# Patient Record
Sex: MALE | Race: WHITE | NOT HISPANIC OR LATINO | Employment: OTHER | URBAN - METROPOLITAN AREA
[De-identification: names, ages, dates, MRNs, and addresses within clinical notes are randomized per-mention and may not be internally consistent; named-entity substitution may affect disease eponyms.]

---

## 2022-07-12 ENCOUNTER — MEDICAL CORRESPONDENCE (OUTPATIENT)
Dept: HEALTH INFORMATION MANAGEMENT | Facility: CLINIC | Age: 62
End: 2022-07-12

## 2022-07-14 ENCOUNTER — REFERRAL (OUTPATIENT)
Dept: TRANSPLANT | Facility: CLINIC | Age: 62
End: 2022-07-14

## 2022-07-14 DIAGNOSIS — K65.2 SBP (SPONTANEOUS BACTERIAL PERITONITIS) (H): ICD-10-CM

## 2022-07-14 DIAGNOSIS — Z12.5 ENCOUNTER FOR SCREENING FOR MALIGNANT NEOPLASM OF PROSTATE: ICD-10-CM

## 2022-07-14 DIAGNOSIS — J90 PLEURAL EFFUSION: ICD-10-CM

## 2022-07-14 DIAGNOSIS — K74.60 LIVER CIRRHOSIS SECONDARY TO NASH (H): ICD-10-CM

## 2022-07-14 DIAGNOSIS — Z11.59 ENCOUNTER FOR SCREENING FOR OTHER VIRAL DISEASES: ICD-10-CM

## 2022-07-14 DIAGNOSIS — R94.39 ABNORMAL RESULT OF OTHER CARDIOVASCULAR FUNCTION STUDY: ICD-10-CM

## 2022-07-14 DIAGNOSIS — K76.82 HEPATIC ENCEPHALOPATHY (H): ICD-10-CM

## 2022-07-14 DIAGNOSIS — K75.81 LIVER CIRRHOSIS SECONDARY TO NASH (H): ICD-10-CM

## 2022-07-14 DIAGNOSIS — E11.9 DIABETES MELLITUS, TYPE 2 (H): ICD-10-CM

## 2022-07-14 DIAGNOSIS — K76.6 PORTAL HYPERTENSION (H): ICD-10-CM

## 2022-07-14 DIAGNOSIS — K70.30 ALCOHOLIC CIRRHOSIS (H): Primary | ICD-10-CM

## 2022-07-14 NOTE — LETTER
July 21, 2022    Juan Epperson  [address] [facility] [address]  [place] [address]    Dear Juan,    Thank you for your interest in the Transplant Center at [facility]. We look forward to being a part of your care team and assisting you through the transplant process.    As we discussed, your transplant coordinator is Ra Winslow Jr. (Liver).  You may call your coordinator at any time with questions or concerns at [phone].    Please complete the following.    1. Fill out and return the enclosed forms    Authorization for Electronic Communication    Authorization to Discuss Protected Health Information    Authorization for Release of Protected Health Information    Authorization for Care Everywhere Release of Information    2. Sign up for:    [employer], access to your electronic medical record (see enclosed pamphlet)    "[employer], a transplant education website    You can use these tools to learn more about your transplant, communicate with your care team, and track your medical details      Sincerely,      Solid Organ Transplant  [facility]    cc: Referring Physician and PCP

## 2022-07-14 NOTE — LETTER
PHYSICIAN ORDERS    DATE & TIME ISSUED: 2022 11:59 AM  PATIENT NAME: Juan Epperson   : 1960     [facility] MR# : [id number]     DIAGNOSIS:  cirrhosis, pleural effusion, pneumonia   ICD-10 CODE: K74.60, J90.0, J18.9  Orders  1 year after issue.  Please schedule in prep for liver transplant evaluation, ideally before 22  These labs must be drawn all together on the same day when done:          CTA Anginogram coronary arteries - Please do in [place]     PLEASE FAX RESULTS AS SOON AS POSSIBLE TO [phone], ATTN:Taylor    .  Hepatology/Liver Transplant  Medical Director, Liver Transplantation  [facility]

## 2022-07-14 NOTE — LETTER
PHYSICIAN ORDERS    DATE & TIME ISSUED: 2022 11:59 AM  PATIENT NAME: Juan Epperson   : 1960     [facility] MR# : [id number]     DIAGNOSIS:  cirrhosis, pleural effusion, pneumonia   ICD-10 CODE: K74.60, J90.0, J18.9  Orders  1 year after issue.  Please schedule in prep for liver transplant evaluation, ideally before 22  These labs must be drawn all together on the same day when done:     Pulmonary Function Tests (PFTs)          CTA Anginogram coronary arteries          DEXA bone density scan     PLEASE FAX RESULTS AS SOON AS POSSIBLE TO [phone], ATTN:Taylor    .  Hepatology/Liver Transplant  Medical Director, Liver Transplantation  [facility]

## 2022-07-20 ENCOUNTER — TELEPHONE (OUTPATIENT)
Dept: TRANSPLANT | Facility: CLINIC | Age: 62
End: 2022-07-20

## 2022-07-20 VITALS — BODY MASS INDEX: 22.53 KG/M2 | WEIGHT: 170 LBS | HEIGHT: 73 IN

## 2022-07-20 PROBLEM — K76.82 HEPATIC ENCEPHALOPATHY (H): Status: ACTIVE | Noted: 2022-07-20

## 2022-07-20 PROBLEM — E11.9 DIABETES MELLITUS, TYPE 2 (H): Status: ACTIVE | Noted: 2022-07-20

## 2022-07-20 PROBLEM — K74.60 LIVER CIRRHOSIS SECONDARY TO NASH (H): Status: ACTIVE | Noted: 2022-07-20

## 2022-07-20 PROBLEM — J90 PLEURAL EFFUSION: Status: ACTIVE | Noted: 2022-07-20

## 2022-07-20 PROBLEM — K75.81 LIVER CIRRHOSIS SECONDARY TO NASH (H): Status: ACTIVE | Noted: 2022-07-20

## 2022-07-20 PROBLEM — K65.2 SBP (SPONTANEOUS BACTERIAL PERITONITIS) (H): Status: ACTIVE | Noted: 2022-07-20

## 2022-07-20 NOTE — TELEPHONE ENCOUNTER
Patient was asked the following questions during liver intake call.     Referring Provider: Rosanne Talley PA-C   Referring Diagnosis: Alcoholic Cirrhosis of the Liver.   PCP: Dr. Naga Watson     1)Do you know why you have liver disease: Yes       If Alcoholic Cirrhosis is present when was your last drink: 1/2020       Have you ever been through treatment for alcohol: No  2) Presence of Ascites: Yes Paracentesis: Yes  3) Presence of Hepatic Encephalopathy: Yes Medications: Yes  4) History of GI Bleeding: No  5) Oxygen Use: None  6) EGD: Yes@ [facility]  7) Colonoscopy: Yes @ [facility]    8) MELD Score: 15  9) Labs available for review from PCP/GI: Yes  10)HCC Diagnosis: No                11)Insurance information: [employer]              Policy Mejia: Self              Subscriber/Policy/ID Number: [id number]             Group Number:     Referral intake process completed.  Patient is aware that after financial approval is received, medical records will be requested.   Patient confirmed for a callback from transplant coordinator on 7/27/22.  Tentative evaluation date TBD.    Confirmed coordinator will discuss evaluation process in more detail at the time of their call.   Patient is aware of the need to arrange age appropriate cancer screening, vaccinations, and dental care.  Reminded patient to complete questionnaire, complete medical records release, and review packet prior to evaluation visit .  Assessed patient for special needs (ie--wheelchair, assistance, guardian, and ):  Walker  Patient instructed to call [phone] with questions.     Patient gave verbal consent during intake call to obtain medical records and documents outside of [facility]:  Yes    BHAVIK Cheung, LPN       Solid Organ Transplant

## 2022-07-20 NOTE — TELEPHONE ENCOUNTER
July 20, 2022 1:25 PM - Ra Winslow Jr., RN:   MELD score 12 with INR done 7/15/22, others on 7/20    Will discuss referral with patient, but most likely will set up consult with Dr. Stearns for virtual or in [place].

## 2022-07-28 NOTE — TELEPHONE ENCOUNTER
Patients wife Shelley called questioning status of referral. Reitereated note below from patients coordinator, Chino.     Rescheduled call with Chino and patient for 8/3/22.     BHAVIK Cheung, LPN       Solid Organ Transplant

## 2022-08-03 NOTE — TELEPHONE ENCOUNTER
Just tried calling patient again, but had to LVM as no answer.     MELD 13 from labs on 7/27/22 in [place]    Plan: will close referral and set patient up with Dr. Stearns in [place].     Referral from DAWN Ndiaye at [facility] GI NP    JARA cirrhosis   MELD 13 (7/27/22)    Dx about 1.5-2 yrs - found during back surgery.     [place] - outside  [facility]DI - due to liver    Ascites -  Taps - couple to start, had chest tube in/out during last admission approx 7/23/22 (see [facility])  HE - yes with admissions   GIB - no  EGD - 1/25/22 (CE)  Colon - 12/20/21 ([facility])    Head - no  Heart - no  Lung - fluid build up on L. Never smoker  Kidneys - no  Cancer - skin on head (removed in [place])    DM type 2 - on metformin; had DM about 12-13 yrs    Plan: given low MELD (13), will set up with Dr. Stearns in [place] on 8/17/22. Just spoke with Riri at [facility] who will call patient (patient aware of this) to set up exact time that day.   Full eval pending 8/17 consult

## 2022-08-25 NOTE — TELEPHONE ENCOUNTER
Spoke with patient and wife, who are interested in coming down for eval on 9/27 & 9/28.    They have my direct phone number to call.    Saw Dr. Stearns 8/17 in [place]    MTP sent to patient's wife now - [email or web address]  Will ask admins to sent out paper forms as well.    MELD 15 on 8/17/22    From referring Rosanne Mayank 8/12/22:    Patient is a 61 y.o. male following up for decompensated liver cirrhosis (NAFLD) with ascites, hepatic encephalopathy, and esophageal varices requiring banding.     Patient was hospitalized in July due to encephalopathy, hyperkalemia, ADARSH, loculated left pleural effusion with concern for underlying pneumonia. Diuretics were discontinued due to the ADARSH and hyperkalemia. They were then resumed prior to discharge. He was treated with 10 days of IV antibiotics for possible pneumonia. Patient did require a chest tube during the hospitalization. Midodrine was added to his regimen prior to discharge due to hypotension.     Patient is following up today to discuss decreasing diuretic therapy due to symptoms (lightheadedness) of hypotension, recent ADARSH with hyperkalemia. His renal function and potassium level have returned to within normal limits even after diuretic therapy was restarted. He is currently taking Lasix 40 mg twice daily, spironolactone 100 mg twice daily, and potassium chloride 20 mEq twice daily. He is not experiencing fluid retention in the lower legs, ascites. Blood pressures remain on the lower end of normal limits (90's/50's). Patient states that he still will experience lightheadedness with position changes although this seems to be improving with midodrine.     Patient is also taking nadolol 20 mg daily for esophageal variceal prophylaxis. Pulse typically lies in the 60s, which is not within goal range for variceal prophylaxis (goal would be between 50 and 60). Most recent upper endoscopy completed in January was remarkable for 2 small varices that disappear with  insufflation. To be due for repeat upper endoscopy in January 2023.

## 2022-09-08 PROBLEM — N20.0 CALCULUS OF KIDNEY: Status: ACTIVE | Noted: 2022-01-28

## 2022-09-08 PROBLEM — K76.6 PORTAL HYPERTENSION (H): Status: ACTIVE | Noted: 2021-09-21

## 2022-09-08 PROBLEM — R79.89 ELEVATED LFTS: Status: ACTIVE | Noted: 2021-09-21

## 2022-09-08 PROBLEM — D69.6 THROMBOCYTOPENIA (H): Status: ACTIVE | Noted: 2021-09-21

## 2022-09-08 RX ORDER — FUROSEMIDE 20 MG
20 TABLET ORAL
COMMUNITY
Start: 2022-08-12 | End: 2023-08-12

## 2022-09-08 RX ORDER — LACTULOSE 10 G/15ML
45 SOLUTION ORAL
COMMUNITY
Start: 2022-07-27

## 2022-09-08 RX ORDER — POTASSIUM CHLORIDE 1500 MG/1
1 TABLET, EXTENDED RELEASE ORAL DAILY
COMMUNITY
Start: 2022-08-12 | End: 2023-08-12

## 2022-09-08 RX ORDER — PANTOPRAZOLE SODIUM 40 MG/1
1 TABLET, DELAYED RELEASE ORAL DAILY
COMMUNITY
Start: 2022-08-19

## 2022-09-08 RX ORDER — MIDODRINE HYDROCHLORIDE 5 MG/1
5 TABLET ORAL
COMMUNITY
Start: 2022-07-27

## 2022-09-08 RX ORDER — FUROSEMIDE 40 MG
1 TABLET ORAL DAILY
COMMUNITY
Start: 2022-08-12 | End: 2023-08-12

## 2022-09-08 RX ORDER — FOLIC ACID 1 MG/1
1 TABLET ORAL DAILY
COMMUNITY
Start: 2022-08-11

## 2022-09-08 RX ORDER — SPIRONOLACTONE 100 MG/1
1 TABLET, FILM COATED ORAL 2 TIMES DAILY
COMMUNITY
Start: 2022-08-22

## 2022-09-08 RX ORDER — BACLOFEN 20 MG
TABLET ORAL
COMMUNITY

## 2022-09-15 NOTE — TELEPHONE ENCOUNTER
RECORDS RECEIVED FROM:   DATE RECEIVED:   NOTES STATUS DETAILS   OFFICE NOTE from referring provider    Internal SOT   OFFICE NOTE from other cardiologist    N/A    DISCHARGE SUMMARY from hospital    N/A    DISCHARGE REPORT from the ER   N/A    OPERATIVE REPORT    N/A    MEDICATION LIST   Internal    LABS     BMP   Care Everywhere 8-17-22   CBC   Care Everywhere 7-27-22   CMP   Care Everywhere 7-26-22   Lipids   N/A    TSH   Care Everywhere 7-20-22   DIAGNOSTIC PROCEDURES     EKG   In process 7-15-22 Requested   Monitor Reports   N/A    IMAGING (DISC & REPORT)      Echo   In process 7-16-22 Requested   Stress Tests   N/A    Cath   N/A    MRI/MRA   N/A    CT/CTA   In process 7-23-22 Requested     Action 9/16/22 [facility]  Fax #[phone]   Action Taken Requested:    CT Chest    Echo    EKG Strips   7-23-22    7-16-22    7-15-22, 12-20-21     Sent second request 9/22

## 2022-09-26 LAB
ABO/RH(D): NORMAL
ANTIBODY SCREEN: NEGATIVE
SPECIMEN EXPIRATION DATE: NORMAL

## 2022-09-26 NOTE — PROGRESS NOTES
[facility] Solid Organ Transplant  Outpatient MNT: Liver Transplant Evaluation    Current BMI: 24.4 (HT 71 in,  lbs/80 kg)  BMI is within recommendation of <45 for liver transplant    Fried Frailty-- Frail (5/5)    FraiLT-- Frail   [email or web address]    Recommended Interventions to Address Frailty:  - Continue working with PT  - Increase dietary protein intake      Time Spent: 30 minutes  Visit Type: Initial   Referring Physician: Asher   Pt accompanied by: his wife    History of previous txp: none     Nutrition Assessment  H/o DM II, JARA  Wife cooks low sodium    - Appetite: has improved since last year (was poor after back surgery)  - Food allergies/intolerances: no  - Meal prep & grocery shopping: wife does  - Previous RD education: no  - Issues chewing or swallowing: no  - N/V/D/C: no   - Food access concerns: not asked     Vitamins, Supplements, Pertinent Meds: folic acid, vit D, calcium, occasional magnesium, potassium   Herbal Medicines/Supplements: none     Edema: none since last Oct (2021)     Weight hx: pt reports losing 100 lbs of dry weight since June 2021 following a back surgery; weight is stable now  - CE 5/2022: 212 lbs  - CE 2/2022: 199 lbs   - CE 12/2021: 204 lbs  - CE 10/2021: 230 lbs  *no other weights in chart except dating back to 2012- 240 lbs    Diet Recall  Breakfast English muffin with PBJ    Lunch Burrito, sandwich (deli meat - LS)   Dinner Veggies, steak/fish    Snacks Sunflower seeds    Beverages 1 Ensure/day (x 1 month), water    Dining out 1x/week      Physical Activity  PT exercises most days of the week x 1 month - 10 minutes  Walks somewhat, tires easily; has had a walker since back surgery     Lower mobility since back surgery     Malnutrition   % Intake: No decreased intake noted  % Weight Loss: > 20% in 1 year (severe malnutrition)  Subcutaneous Fat Loss: Moderate/Severe  Muscle Loss: Moderate/Severe   Fluid Accumulation/Edema: None  noted  Malnutrition Diagnosis: Moderate vs severe malnutrition in the context of chronic illness     Nutrition Diagnosis  Inadequate protein intake r/t increased needs with liver disease AEB moderate vs severe malnutrition, frail status, reduced muscle mass, diet recall likely not sufficient in protein.     Nutrition Intervention  Nutrition education provided:  Discussed sodium intake (low sodium foods and drinks, seasoning food without salt and tips for low sodium diet). Pt/wife seem to be doing well in regard to a lower sodium diet.     Reviewed adequate protein intake. Encouraged receiving protein from both animal and plant based sources. Increase to 2-3 ONS/day. He is getting protein at all meals. Consider HS protein. Continue to work with PT to improve functional status. Pt/wife report PT was delayed after back surgery d/t cirrhosis and having care in another state.     Reviewed post txp diet guidelines in brief (will review in further detail post txp):  (1) Review of proper food safety measures d/t immunosuppressant therapy post-op and increased risk for food-borne illness    (2) Avoid the following post txp d/t risk for rejection, unknown effects on the organs, and/or potential interactions with immunosuppressants:  - Herbal, Chinese, holistic, chiropractic, natural, alternative medicines and supplements  - Detoxes and cleanses  - Weight loss pills  - Protein powders or other products with extracts or herbs (ie green tea extract)    (3) Med regimen and possible side effects    Patient Understanding: Pt verbalized understanding of education provided.  Expected Engagement: Good  Follow-Up Plans: PRN     Nutrition Goals  Increase to 2-3 ONS/day     Helen Kyle, RD, LD, CCTD

## 2022-09-27 ENCOUNTER — OFFICE VISIT (OUTPATIENT)
Dept: TRANSPLANT | Facility: CLINIC | Age: 62
End: 2022-09-27
Attending: INTERNAL MEDICINE
Payer: COMMERCIAL

## 2022-09-27 ENCOUNTER — ANCILLARY PROCEDURE (OUTPATIENT)
Dept: ULTRASOUND IMAGING | Facility: CLINIC | Age: 62
End: 2022-09-27
Attending: INTERNAL MEDICINE
Payer: COMMERCIAL

## 2022-09-27 ENCOUNTER — COMMITTEE REVIEW (OUTPATIENT)
Dept: TRANSPLANT | Facility: CLINIC | Age: 62
End: 2022-09-27

## 2022-09-27 ENCOUNTER — PRE VISIT (OUTPATIENT)
Dept: CARDIOLOGY | Facility: CLINIC | Age: 62
End: 2022-09-27

## 2022-09-27 ENCOUNTER — LAB (OUTPATIENT)
Dept: LAB | Facility: CLINIC | Age: 62
End: 2022-09-27
Attending: INTERNAL MEDICINE

## 2022-09-27 ENCOUNTER — OFFICE VISIT (OUTPATIENT)
Dept: CARDIOLOGY | Facility: CLINIC | Age: 62
End: 2022-09-27
Attending: INTERNAL MEDICINE
Payer: COMMERCIAL

## 2022-09-27 ENCOUNTER — ANCILLARY PROCEDURE (OUTPATIENT)
Dept: GENERAL RADIOLOGY | Facility: CLINIC | Age: 62
End: 2022-09-27
Attending: INTERNAL MEDICINE
Payer: COMMERCIAL

## 2022-09-27 VITALS
HEART RATE: 82 BPM | HEIGHT: 71 IN | SYSTOLIC BLOOD PRESSURE: 97 MMHG | OXYGEN SATURATION: 97 % | DIASTOLIC BLOOD PRESSURE: 63 MMHG | BODY MASS INDEX: 24.56 KG/M2 | WEIGHT: 175.4 LBS

## 2022-09-27 VITALS
HEART RATE: 79 BPM | WEIGHT: 175.9 LBS | SYSTOLIC BLOOD PRESSURE: 114 MMHG | BODY MASS INDEX: 23.21 KG/M2 | DIASTOLIC BLOOD PRESSURE: 66 MMHG | OXYGEN SATURATION: 98 %

## 2022-09-27 DIAGNOSIS — D69.6 THROMBOCYTOPENIA (H): ICD-10-CM

## 2022-09-27 DIAGNOSIS — J90 PLEURAL EFFUSION: ICD-10-CM

## 2022-09-27 DIAGNOSIS — K74.60 LIVER CIRRHOSIS SECONDARY TO NASH (H): ICD-10-CM

## 2022-09-27 DIAGNOSIS — E11.9 TYPE 2 DIABETES MELLITUS WITHOUT COMPLICATION, WITHOUT LONG-TERM CURRENT USE OF INSULIN (H): ICD-10-CM

## 2022-09-27 DIAGNOSIS — E11.9 DIABETES MELLITUS, TYPE 2 (H): ICD-10-CM

## 2022-09-27 DIAGNOSIS — K70.30 ALCOHOLIC CIRRHOSIS (H): ICD-10-CM

## 2022-09-27 DIAGNOSIS — K76.82 HEPATIC ENCEPHALOPATHY (H): ICD-10-CM

## 2022-09-27 DIAGNOSIS — K76.6 PORTAL HYPERTENSION (H): ICD-10-CM

## 2022-09-27 DIAGNOSIS — K70.30 ALCOHOLIC CIRRHOSIS OF LIVER WITHOUT ASCITES (H): ICD-10-CM

## 2022-09-27 DIAGNOSIS — K65.2 SBP (SPONTANEOUS BACTERIAL PERITONITIS) (H): ICD-10-CM

## 2022-09-27 DIAGNOSIS — Z11.59 ENCOUNTER FOR SCREENING FOR OTHER VIRAL DISEASES: ICD-10-CM

## 2022-09-27 DIAGNOSIS — R07.9 CHEST PAIN, UNSPECIFIED TYPE: ICD-10-CM

## 2022-09-27 DIAGNOSIS — K75.81 LIVER CIRRHOSIS SECONDARY TO NASH (H): ICD-10-CM

## 2022-09-27 DIAGNOSIS — I10 PRIMARY HYPERTENSION: Primary | ICD-10-CM

## 2022-09-27 DIAGNOSIS — Z12.5 ENCOUNTER FOR SCREENING FOR MALIGNANT NEOPLASM OF PROSTATE: ICD-10-CM

## 2022-09-27 DIAGNOSIS — Z01.810 PRE-OPERATIVE CARDIOVASCULAR EXAMINATION: ICD-10-CM

## 2022-09-27 LAB
ABO/RH(D): NORMAL
ALBUMIN MFR UR ELPH: 9.3 MG/DL
ALBUMIN SERPL BCG-MCNC: 2.8 G/DL (ref 3.5–5.2)
ALBUMIN UR-MCNC: NEGATIVE MG/DL
ALP SERPL-CCNC: 134 U/L (ref 40–129)
ALT SERPL W P-5'-P-CCNC: 9 U/L (ref 10–50)
ANION GAP SERPL CALCULATED.3IONS-SCNC: 6 MMOL/L (ref 7–15)
ANTIBODY ID: NORMAL
ANTIBODY TITER IGM SCREEN: POSITIVE
APPEARANCE UR: CLEAR
AST SERPL W P-5'-P-CCNC: 44 U/L (ref 10–50)
B IGG TITR SERPL: >256 {TITER}
BILIRUB DIRECT SERPL-MCNC: 0.77 MG/DL (ref 0–0.3)
BILIRUB SERPL-MCNC: 2.4 MG/DL
BILIRUB UR QL STRIP: NEGATIVE
BUN SERPL-MCNC: 9.6 MG/DL (ref 8–23)
CALCIUM SERPL-MCNC: 10.1 MG/DL (ref 8.8–10.2)
CHLORIDE SERPL-SCNC: 100 MMOL/L (ref 98–107)
CHOLEST SERPL-MCNC: 187 MG/DL
CMV IGG SERPL IA-ACNC: >10 U/ML
CMV IGG SERPL IA-ACNC: ABNORMAL
COLOR UR AUTO: YELLOW
CREAT SERPL-MCNC: 0.51 MG/DL (ref 0.67–1.17)
CREAT UR-MCNC: 70.6 MG/DL
DEPRECATED CALCIDIOL+CALCIFEROL SERPL-MC: 16 UG/L (ref 20–75)
DEPRECATED HCO3 PLAS-SCNC: 29 MMOL/L (ref 22–29)
EBV VCA IGG SER IA-ACNC: 98.7 U/ML
EBV VCA IGG SER IA-ACNC: POSITIVE
ERYTHROCYTE [DISTWIDTH] IN BLOOD BY AUTOMATED COUNT: 14.9 % (ref 10–15)
GFR SERPL CREATININE-BSD FRML MDRD: >90 ML/MIN/1.73M2
GLUCOSE SERPL-MCNC: 93 MG/DL (ref 70–99)
GLUCOSE UR STRIP-MCNC: NEGATIVE MG/DL
HAV IGG SER QL IA: NONREACTIVE
HBA1C MFR BLD: 5.2 %
HBV SURFACE AB SERPL IA-ACNC: >1000 M[IU]/ML
HBV SURFACE AB SERPL IA-ACNC: REACTIVE M[IU]/ML
HCT VFR BLD AUTO: 34.9 % (ref 40–53)
HDLC SERPL-MCNC: 47 MG/DL
HGB BLD-MCNC: 11.8 G/DL (ref 13.3–17.7)
HGB UR QL STRIP: NEGATIVE
HIV 1+2 AB+HIV1 P24 AG SERPL QL IA: NONREACTIVE
INR PPP: 1.25 (ref 0.85–1.15)
KETONES UR STRIP-MCNC: NEGATIVE MG/DL
LDLC SERPL CALC-MCNC: 127 MG/DL
LEUKOCYTE ESTERASE UR QL STRIP: NEGATIVE
MCH RBC QN AUTO: 32.8 PG (ref 26.5–33)
MCHC RBC AUTO-ENTMCNC: 33.8 G/DL (ref 31.5–36.5)
MCV RBC AUTO: 97 FL (ref 78–100)
NITRATE UR QL: NEGATIVE
NONHDLC SERPL-MCNC: 140 MG/DL
PH UR STRIP: 6 [PH] (ref 5–7)
PLATELET # BLD AUTO: 149 10E3/UL (ref 150–450)
POTASSIUM SERPL-SCNC: 4.4 MMOL/L (ref 3.4–5.3)
PROT SERPL-MCNC: 7.7 G/DL (ref 6.4–8.3)
PROT/CREAT 24H UR: 0.13 MG/MG CR (ref 0–0.2)
PSA SERPL-MCNC: 0.04 NG/ML (ref 0–4.5)
RBC # BLD AUTO: 3.6 10E6/UL (ref 4.4–5.9)
RBC URINE: 1 /HPF
SODIUM SERPL-SCNC: 135 MMOL/L (ref 136–145)
SP GR UR STRIP: 1.02 (ref 1–1.03)
SPECIMEN EXPIRATION DATE: NORMAL
T PALLIDUM AB SER QL: NONREACTIVE
TRIGL SERPL-MCNC: 67 MG/DL
UROBILINOGEN UR STRIP-MCNC: NORMAL MG/DL
WBC # BLD AUTO: 6.5 10E3/UL (ref 4–11)
WBC URINE: <1 /HPF

## 2022-09-27 PROCEDURE — 80053 COMPREHEN METABOLIC PANEL: CPT | Performed by: PATHOLOGY

## 2022-09-27 PROCEDURE — 85027 COMPLETE CBC AUTOMATED: CPT | Performed by: PATHOLOGY

## 2022-09-27 PROCEDURE — 80307 DRUG TEST PRSMV CHEM ANLYZR: CPT | Mod: 90 | Performed by: PATHOLOGY

## 2022-09-27 PROCEDURE — 36415 COLL VENOUS BLD VENIPUNCTURE: CPT | Performed by: PATHOLOGY

## 2022-09-27 PROCEDURE — 71046 X-RAY EXAM CHEST 2 VIEWS: CPT | Mod: GC | Performed by: RADIOLOGY

## 2022-09-27 PROCEDURE — G0463 HOSPITAL OUTPT CLINIC VISIT: HCPCS | Mod: 25

## 2022-09-27 PROCEDURE — 86708 HEPATITIS A ANTIBODY: CPT | Performed by: INTERNAL MEDICINE

## 2022-09-27 PROCEDURE — 93005 ELECTROCARDIOGRAM TRACING: CPT

## 2022-09-27 PROCEDURE — 99000 SPECIMEN HANDLING OFFICE-LAB: CPT | Performed by: PATHOLOGY

## 2022-09-27 PROCEDURE — 86644 CMV ANTIBODY: CPT | Performed by: INTERNAL MEDICINE

## 2022-09-27 PROCEDURE — 86481 TB AG RESPONSE T-CELL SUSP: CPT | Performed by: INTERNAL MEDICINE

## 2022-09-27 PROCEDURE — 99207 PR NO CHARGE COORDINATED CARE PS: CPT

## 2022-09-27 PROCEDURE — 86780 TREPONEMA PALLIDUM: CPT | Performed by: INTERNAL MEDICINE

## 2022-09-27 PROCEDURE — 86901 BLOOD TYPING SEROLOGIC RH(D): CPT | Performed by: INTERNAL MEDICINE

## 2022-09-27 PROCEDURE — 93975 VASCULAR STUDY: CPT | Performed by: RADIOLOGY

## 2022-09-27 PROCEDURE — 99204 OFFICE O/P NEW MOD 45 MIN: CPT | Performed by: TRANSPLANT SURGERY

## 2022-09-27 PROCEDURE — 82306 VITAMIN D 25 HYDROXY: CPT | Performed by: INTERNAL MEDICINE

## 2022-09-27 PROCEDURE — 86850 RBC ANTIBODY SCREEN: CPT | Performed by: INTERNAL MEDICINE

## 2022-09-27 PROCEDURE — 80321 ALCOHOLS BIOMARKERS 1OR 2: CPT | Mod: 90 | Performed by: PATHOLOGY

## 2022-09-27 PROCEDURE — 86706 HEP B SURFACE ANTIBODY: CPT | Performed by: INTERNAL MEDICINE

## 2022-09-27 PROCEDURE — 82248 BILIRUBIN DIRECT: CPT | Performed by: PATHOLOGY

## 2022-09-27 PROCEDURE — 85610 PROTHROMBIN TIME: CPT | Performed by: PATHOLOGY

## 2022-09-27 PROCEDURE — G0103 PSA SCREENING: HCPCS | Performed by: INTERNAL MEDICINE

## 2022-09-27 PROCEDURE — 83036 HEMOGLOBIN GLYCOSYLATED A1C: CPT | Performed by: INTERNAL MEDICINE

## 2022-09-27 PROCEDURE — 99204 OFFICE O/P NEW MOD 45 MIN: CPT | Performed by: INTERNAL MEDICINE

## 2022-09-27 PROCEDURE — 80061 LIPID PANEL: CPT | Performed by: INTERNAL MEDICINE

## 2022-09-27 PROCEDURE — 86886 COOMBS TEST INDIRECT TITER: CPT | Performed by: INTERNAL MEDICINE

## 2022-09-27 PROCEDURE — 86665 EPSTEIN-BARR CAPSID VCA: CPT | Performed by: INTERNAL MEDICINE

## 2022-09-27 PROCEDURE — 86870 RBC ANTIBODY IDENTIFICATION: CPT | Performed by: INTERNAL MEDICINE

## 2022-09-27 ASSESSMENT — PAIN SCALES - GENERAL: PAINLEVEL: NO PAIN (0)

## 2022-09-27 NOTE — NURSING NOTE
No med changes today.     Plan for CTA and Echo. I will fax orders to [facility] in [place], [place].    Follow up with Dr. Baer as needed.     Paulino Chan RN   Cardiology Nurse Coordinator

## 2022-09-27 NOTE — LETTER
9/27/2022      RE: Juan Epperson  [address]  [place] [address]       Dear Colleague,    Thank you for the opportunity to participate in the care of your patient, Juan Epperson, at the [facility] [place] [facility]. Please see a copy of my visit note below.    I am delighted to see Juan Epperson in consultation.The primary encounter diagnosis was HTN (hypertension). Diagnoses of Alcoholic cirrhosis (H), Hepatic encephalopathy (H), Pleural effusion, Diabetes mellitus, type 2 (H), SBP (spontaneous bacterial peritonitis) (H), Liver cirrhosis secondary to JARA (H), Portal hypertension (H), Thrombocytopenia (H), Pre-operative cardiovascular examination, and Chest pain, unspecified type were also pertinent to this visit.   As you know, the patient is a 61 year old  male. He   has a past medical history of Cancer (H), Diabetes (H), Diabetes mellitus, type 2 (H) (07/20/2022), Hepatic encephalopathy (H) (07/20/2022), History of blood transfusion, Liver cirrhosis secondary to JARA (H) (07/20/2022), Pleural effusion (07/20/2022), and Pneumonia..    On this visit, the patient states that he has limited exercise tolerance.  The patient denies chest pressure/discomfort, palpitations, near-syncope, syncope, orthopnea, paroxysmal nocturnal dyspnea and lower extermity edema.    The patient's cardiovascular risk factors include diabetes mellitus.    The following portions of the patient's history were reviewed and updated as appropriate: allergies, current medications, past family history, past medical history, past social history, past surgical history, and the problem list.    PMH: The patient's past medical history includes:    Past Medical History:   Diagnosis Date     Cancer (H)      Diabetes (H)      Diabetes mellitus, type 2 (H) 07/20/2022     Hepatic encephalopathy (H) 07/20/2022     History of blood transfusion      Liver cirrhosis  secondary to JARA (H) 07/20/2022     Pleural effusion 07/20/2022     Pneumonia       History reviewed. No pertinent surgical history.    The patient's medications as of the current encounter are:     Current Outpatient Medications   Medication Sig Dispense Refill     folic acid (FOLVITE) 1 MG tablet Take 1 tablet by mouth daily       furosemide (LASIX) 20 MG tablet Take 20 mg by mouth       furosemide (LASIX) 40 MG tablet Take 1 tablet by mouth daily       lactulose (CHRONULAC) 10 GM/15ML solution Take 45 mLs by mouth       Magnesium Oxide 500 MG TABS        metFORMIN (GLUCOPHAGE) 1000 MG tablet Take 1,000 mg by mouth       midodrine (PROAMATINE) 5 MG tablet Take 5 mg by mouth       pantoprazole (PROTONIX) 40 MG EC tablet Take 1 tablet by mouth daily       potassium chloride ER (KLOR-CON M) 20 MEQ CR tablet Take 1 tablet by mouth daily       rifaximin (XIFAXAN) 550 MG TABS tablet Take 550 mg by mouth       sertraline (ZOLOFT) 50 MG tablet Take 50 mg by mouth       spironolactone (ALDACTONE) 100 MG tablet Take 1 tablet by mouth 2 times daily         Labs:     No results found for any previous visit.       Allergies:    Allergies   Allergen Reactions     Codeine Rash and Hives       Family History:   Family History   Problem Relation Age of Onset     Chronic Obstructive Pulmonary Disease Mother      Dementia Brother      Chronic Obstructive Pulmonary Disease Sister        Psychosocial history:  reports that he has never smoked. He has never used smokeless tobacco. He reports previous alcohol use. He reports that he does not use drugs.    Review of systems: negative for, exertional chest pain or pressure, paroxysmal nocturnal dyspnea, dyspnea on exertion, orthopnea, lower extremity edema, syncope or near-syncope and claudication    In addition,   General: No change in weight, sleep or appetite.  Normal energy.  No fever or chills  Eyes: Negative for vision changes or eye problems  ENT: No problems with ears, nose or  throat.  No difficulty swallowing.  Resp: No coughing, wheezing or shortness of breath  GI: No nausea, vomiting,  heartburn, abdominal pain, diarrhea, constipation or change in bowel habits, liver cirrhosis  : No urinary frequency or dysuria, bladder or kidney problems  Musculoskeletal: No significant muscle or joint pains  Neurologic: No headaches, numbness, tingling, weakness, problems with balance or coordination  Psychiatric: No problems with anxiety, depression or mental health  Heme/immune/allergy: No history of bleeding or clotting problems or anemia.  No allergies or immune system problems  Endocrine: Diabetes  Skin: No rashes,worrisome lesions or skin problems  Vascular:  No claudication, lifestyle limiting or otherwise; no ischemic rest pain; no non-healing ulcers. No weakness, No loss of sensation        Physical examination  Vitals: /66 (BP Location: Right arm, Patient Position: Chair, Cuff Size: Adult Regular)   Pulse 79   Wt 79.8 kg (175 lb 14.4 oz)   SpO2 98%   BMI 23.21 kg/m    BMI= Body mass index is 23.21 kg/m .    In general, the patient is a pleasant male in no apparent distress.    HEENT: Normiocephalic and atraumatic.  PERRLA.  EOMI.  Sclerae icteric, not injected.  Nares clear.  Pharynx without erythema or exudate.  Dentition intact.    Neck: No adenopathy.  No thyromegaly. Carotids +2/2 bilaterally without bruits.  No jugular venous distension.   Heart:  The PMI is in the 5th ICS in the midclavicular line. There is no heave. Regular rate and rhythm. Normal S1, S2 splits physiologically. No murmur, rub, click, or gallop.    Lungs: Clear to asculation.  No ronchi, wheezes, rales.  No dullness to percussion.   Abdomen: Soft, nontender, nondistended. No organomegaly. No AAA.  No bruits.   Extremities: No clubbing, cyanosis, or edema. The pulses were intact bilaterally.   Neurological: The neurological examination reveal a patient who was oriented to person, place, and time.  The  remainder of the examination was nonfocal.    Cardiac tests include:    ECG - normal sinus rhythm, normal axis, intervals nonspecific T changes    Assessment and Plan    1. Preop - will check echo, CTA  2. Liver cirrhosis - plan transplant  3. Diabetes - on metformin  4. Depression - on zoloft    The patient is to return  prn. The patient understood the treatment plan as outlined above.  There were no barriers to learning. Patient accompanied by wife.      Hong Baer MD

## 2022-09-27 NOTE — LETTER
September 29, 2022    Juan Epperson  [address]  [place] [address]    Dear Mr. Epperson,   The purpose of this letter is to let you know that on  the [facility] Multi-Disciplinary Selection Team reviewed the results of your transplant evaluation. Based on the results of your evaluation and the selection criteria used by our program, the decision was made to not list you on the liver transplant list. This is because of your current level of frailty and malnutrition.   Important things you should know:    If you would like to discuss the decision, or if your medical status changes you may schedule a return visits with your doctor by calling [phone] and asking to speak to your transplant coordinator.    We recommend that you follow up with Dr. Stearns at [facility] when she is up there in the month of December.     We recommend that you continue to follow up with your primary care doctor and your local GI (Rosanne Talley) in order to manage your health concerns.  Enclosed is a letter from [facility] which describes the services offered to patients by [facility] and the Organ Procurement and Transplantation Network.  Thank you for allowing us to participate in your care.  We wish you well.  Sincerely,    Ra Winslow Jr., BSN, RN  Liver Transplant Coordinator  [phone]    Enclosures: OS Letter  cc: Care Team            The Organ Procurement and Transplantation Network  Toll-free patient services line:     Your resource for organ transplant information    If you have a question regarding your own medical care, you always should call your transplant hospital first. However, for general organ transplant-related information, you can call the Organ Procurement and Transplantation Network (OPTN) toll-free patient services line at [phone]- [phone]. Anyone, including potential transplant candidates, candidates, recipients, family members, friends, living donors, and donor  family members, can call this number to:          Talk about organ donation, living donation, the transplant process, the donation process, and transplant policies.    Get a free patient information kit with helpful booklets, waiting list and transplant information, and a list of all transplant hospitals.    Ask questions about the OPTN website (https://optn.transplant.hrsa.gov/), the United Network for Organ Sharing s (UNOS) website (https://unos.org/), or the UNOS website for living donors and transplant recipients. (https://www.transplantliving.org/).    Learn how the OPTN can help you.    Talk about any concerns that you may have with a transplant hospital.    The [facility] s transplant system, the OPTN, is managed under federal contract by the United Network for Organ Sharing (UNOS), which is a non-profit charitable organization. The OPTN helps create and define organ sharing policies that make the best use of donated organs. This process continuously evaluating new advances and discoveries so policies can be adapted to best serve patients waiting for transplants. To do so, the OPTN works closely with transplant professionals, transplant patients, transplant candidates, donor families, living donors, and the public. All transplant programs and organ procurement organizations throughout the country are OPTN members and are obligated to follow the policies the OPTN creates for allocating organs.    The OPTN also is responsible for:      Providing educational material for patients, the public, and professionals.    Raising awareness of the need for donated organs and tissue.    Coordinating organ procurement, matching, and placement.    Collecting information about every organ transplant and donation that occurs in the [place].    Remember, you should contact your transplant hospital directly if you have questions or concerns about your own medical care including medical records, work-up progress, and test  results.    We are not your transplant hospital, and our staff will not be able to answer questions about your case, so please keep your transplant hospital s phone number handy.    However, while you research your transplant needs and learn as much as you can about transplantation and donation, we welcome your call to our toll-free patient services line at [phone].          Updated 4/1/2019

## 2022-09-27 NOTE — PATIENT INSTRUCTIONS
"Cardiology Providers you saw during your visit:  Dr. Baer    Medication changes: None    Follow up:   Coronary CT Angiogram   Echocardiogram   Follow up with Dr. Baer based on testing results. We will contact you once results have been reviewed.     If you have any questions, contact  Paulino Chan RN. We are encouraging the use of [employer] to communicate with your HealthCare Provider     To contact the [facility], please call, [phone]  To schedule an appointment or to leave a message for your Care Team Press #1  If you are a physician calling for another physician Press #2  For Billing Press #3  For Medical Records Press #4\"    "

## 2022-09-27 NOTE — PROGRESS NOTES
"HPI      ROS      Physical Exam    \"Much or all of the text in this note was generated through the use of Dragon Dictate voice to text software. Errors in spelling or words which appear to be out of context are unintentional, may be present due having escaped editing\"    Assessment and Plan:  1. liver transplant evaluation - patient is a fair candidate overall. Benefits and surgical risks of a liver transplantation were discussed.  2.  End stage liver disease due to Laennec's    Surgical evaluation:  1. Portal Vein:Patent  2. Hepatic Artery: Open  3. TIPS: absent  4. Previous Abdominal Surgery: Yes  Hernia surgery  5. Hepatocellular Carcinoma: None  6. Ascites: Present - minimal  7. Costal Angle: wide  8. Portopulmonary Hypertension: absent  9. Hepatopulmonary Syndrome: absent  10. Cardiac Evaluation: needs stress echocardiogram  11. Nutritional Status: Moderate  12. Diabetes: yes, on pills  13.Hypertension no  14. Smoker:no  14: Fraility index:yes  15. Meets guidelines to receive Living Donor  Yes -  ...  16. Potential Living donors No    MELD-Na score: 15 at 9/27/2022  9:57 AM  MELD score: 13 at 9/27/2022  9:57 AM  Calculated from:  Serum Creatinine: 0.51 mg/dL (Using min of 1 mg/dL) at 9/27/2022  9:57 AM  Serum Sodium: 135 mmol/L at 9/27/2022  9:57 AM  Total Bilirubin: 2.4 mg/dL at 9/27/2022  9:57 AM  INR(ratio): 1.25 at 9/27/2022  9:57 AM  Age: 61 years    Recommendations:   He needs to complete evaluation.  Complete full work-up.  He needs outpatient physical therapy.  Also needs MELD labs      Patients overall evaluation will be discussed at the Liver Transplant selection committee meeting with a final recommendation on the patients suitability for transplant to be made at that time.    Consult Full  Details:  Juan Epperson was seen in consultation at the request of Dr. Stearns for evaluation as a potential liver transplant recipient.    Reason for Visit:  Juan Epperson is a 61 year old year old male with " Chery who presents for liver transplant evaluation.    HPI:  Presenting complaint: easy fatiguablity and loss of muscle mass    Patient went for back surgery and at that time he was noted to have liver disease.  He has primarily decompensated in the form of ascites.  No variceal bleeding.  Very severe muscle weakness.      Past Medical History:   Diagnosis Date     Cancer (H)      Diabetes (H)      Diabetes mellitus, type 2 (H) 07/20/2022     Hepatic encephalopathy (H) 07/20/2022     History of blood transfusion      Liver cirrhosis secondary to JARA (H) 07/20/2022     Pleural effusion 07/20/2022     Pneumonia      No past surgical history on file.  No past surgical history on file.  Family History   Problem Relation Age of Onset     Chronic Obstructive Pulmonary Disease Mother      Dementia Brother      Chronic Obstructive Pulmonary Disease Sister      Allergies   Allergen Reactions     Codeine Rash and Hives     Prior to Admission medications    Medication Sig Start Date End Date Taking? Authorizing Provider   folic acid (FOLVITE) 1 MG tablet Take 1 tablet by mouth daily 8/11/22   Reported, Patient   furosemide (LASIX) 20 MG tablet Take 20 mg by mouth 8/12/22 8/12/23  Reported, Patient   furosemide (LASIX) 40 MG tablet Take 1 tablet by mouth daily 8/12/22 8/12/23  Reported, Patient   lactulose (CHRONULAC) 10 GM/15ML solution Take 45 mLs by mouth 7/27/22   Reported, Patient   Magnesium Oxide 500 MG TABS     Reported, Patient   metFORMIN (GLUCOPHAGE) 1000 MG tablet Take 1,000 mg by mouth    Reported, Patient   midodrine (PROAMATINE) 5 MG tablet Take 5 mg by mouth 7/27/22   Reported, Patient   pantoprazole (PROTONIX) 40 MG EC tablet Take 1 tablet by mouth daily 8/19/22   Reported, Patient   potassium chloride ER (KLOR-CON M) 20 MEQ CR tablet Take 1 tablet by mouth daily 8/12/22 8/12/23  Reported, Patient   rifaximin (XIFAXAN) 550 MG TABS tablet Take 550 mg by mouth    Reported, Patient   sertraline (ZOLOFT) 50  MG tablet Take 50 mg by mouth 5/23/22   Reported, Patient   spironolactone (ALDACTONE) 100 MG tablet Take 1 tablet by mouth 2 times daily 8/22/22   Reported, Patient       Previous Transplant Hx: No    Cardiovascular Hx:       h/o Cardiac Issues: No       Exercise Tolerance: shortness of breath with exertion.    Potential Donor(s): No    ROS:    REVIEW OF SYSTEMS (check box if normal)  [x]                GENERAL  [x]                  PULMONARY [x]                 GENITOURINARY  [x]                 CNS                 [x]                  CARDIAC  [x]                  ENDOCRINE  [x]                 EARS,NOSE,THROAT [x]                  GASTROINTESTINAL [x]                  NEUROLOGIC    [x]                 MUSCLOSKELTAL  [x]                   HEMATOLOGY    Examination:     Vitals:  There were no vitals taken for this visit.    GENERAL APPEARANCE: alert and no distress  EYES: PERRL  HENT: mouth without ulcers or lesions  NECK: supple, no adenopathy  RESP: lungs clear to auscultation - no rales, rhonchi or wheezes  CV: regular rhythm, normal rate, no rub   ABDOMEN:  soft, ascites present.  MS: extremities normal- no gross deformities noted, no evidence of inflammation in joints, no muscle tenderness  SKIN: no rash  NEURO: Normal strength and tone, sensory exam grossly normal, mentation intact and speech normal  PSYCH: mentation appears normal. and affect normal/bright      Results:   Recent Results (from the past 168 hour(s))   EKG 12-lead, tracing only (Same Day)    Collection Time: 09/27/22  7:50 AM   Result Value Ref Range    Systolic Blood Pressure  mmHg    Diastolic Blood Pressure  mmHg    Ventricular Rate 78 BPM    Atrial Rate 78 BPM    KY Interval 134 ms    QRS Duration 84 ms     ms    QTc 458 ms    P Axis 65 degrees    R AXIS -14 degrees    T Axis 34 degrees    Interpretation ECG       Sinus rhythm  Nonspecific T wave abnormality  Abnormal ECG  No previous ECGs available     CBC with platelets     Collection Time: 09/27/22  9:57 AM   Result Value Ref Range    WBC Count 6.5 4.0 - 11.0 10e3/uL    RBC Count 3.60 (L) 4.40 - 5.90 10e6/uL    Hemoglobin 11.8 (L) 13.3 - 17.7 g/dL    Hematocrit 34.9 (L) 40.0 - 53.0 %    MCV 97 78 - 100 fL    MCH 32.8 26.5 - 33.0 pg    MCHC 33.8 31.5 - 36.5 g/dL    RDW 14.9 10.0 - 15.0 %    Platelet Count 149 (L) 150 - 450 10e3/uL     I had a long discussion with the patient regarding liver transplantation which included but was not limited to  the following points:    1. Liver transplant selection committee process.  2. The federal rules for cadaveric waiting list, the size and blood type matching of the organ. The availability of living-related donor transplantation.  3. The types of donors: brain death donors, non-heart beating donors, partial liver grafts: splits and living donor grafts  4. Extended criteria  Donors (older age, steasosis) and the increased  risk of primary non-function using the extended criteria donors  5. The CDC high risk donors,  Risk of donor transmitted infections and donor transmitted malignancy  6. The liver transplant operation and the associated risks and technical complications which can include intraoperative death, post operative death,  Primary non-function, bleeding requiring re-operations, arterial and biliary complications, bowel perforations, and intra abdominal abscess. Some of these complicaitons may require a second operation.  7. The postoperative course, the ICU stay and risk of postoperative complications which can include sepsis, MI, stroke, brain injury, pneumonia, pleural effusions, and renal dysfunction.  8. The current 1 year and 5 year graft and patient survivals.  9. The need for life long immunosuppressive therapy and the side effects of these medications, including the possibility of toxicity, opportunistic infections, risk of cancer including lymphoma, and the possibility of rejection even if the patient is taking the medication  exactly as prescribed.  10. The need for compliance with medications and follow-up visits in the clinic and thereafter.  11. The patient and family understand these risks and wish to proceed to transplantation

## 2022-09-27 NOTE — PROGRESS NOTES
I am delighted to see Juan Epperson in consultation. Liver cirrhosis secondary to JARA (H).   As you know, the patient is a 61 year old  male. He   has a past medical history of Cancer (H), Diabetes (H), Diabetes mellitus, type 2 (H) (07/20/2022), Hepatic encephalopathy (H) (07/20/2022), History of blood transfusion, Liver cirrhosis secondary to JARA (H) (07/20/2022), Pleural effusion (07/20/2022), and Pneumonia..    On this visit, the patient states that he has limited exercise tolerance.  The patient denies chest pressure/discomfort, palpitations, near-syncope, syncope, orthopnea, paroxysmal nocturnal dyspnea and lower extermity edema.    The patient's cardiovascular risk factors include diabetes mellitus.    The following portions of the patient's history were reviewed and updated as appropriate: allergies, current medications, past family history, past medical history, past social history, past surgical history, and the problem list.    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 No pertinent surgical history.    The patient's medications as of the current encounter are:     Current Outpatient Medications   Medication Sig Dispense Refill     folic acid (FOLVITE) 1 MG tablet Take 1 tablet by mouth daily       furosemide (LASIX) 20 MG tablet Take 20 mg by mouth       furosemide (LASIX) 40 MG tablet Take 1 tablet by mouth daily       lactulose (CHRONULAC) 10 GM/15ML solution Take 45 mLs by mouth       Magnesium Oxide 500 MG TABS        metFORMIN (GLUCOPHAGE) 1000 MG tablet Take 1,000 mg by mouth       midodrine (PROAMATINE) 5 MG tablet Take 5 mg by mouth       pantoprazole (PROTONIX) 40  MG EC tablet Take 1 tablet by mouth daily       potassium chloride ER (KLOR-CON M) 20 MEQ CR tablet Take 1 tablet by mouth daily       rifaximin (XIFAXAN) 550 MG TABS tablet Take 550 mg by mouth       sertraline (ZOLOFT) 50 MG tablet Take 50 mg by mouth       spironolactone (ALDACTONE) 100 MG tablet Take 1 tablet by mouth 2 times daily         Labs:     No results found for any previous visit.       Allergies:    Allergies   Allergen Reactions     Codeine Rash and Hives       Family History:   Family History   Problem Relation Age of Onset     Chronic Obstructive Pulmonary Disease Mother      Dementia Brother      Chronic Obstructive Pulmonary Disease Sister        Psychosocial history:  reports that he has never smoked. He has never used smokeless tobacco. He reports previous alcohol use. He reports that he does not use drugs.    Review of systems: negative for, exertional chest pain or pressure, paroxysmal nocturnal dyspnea, dyspnea on exertion, orthopnea, lower extremity edema, syncope or near-syncope and claudication    In addition,   General: No change in weight, sleep or appetite.  Normal energy.  No fever or chills  Eyes: Negative for vision changes or eye problems  ENT: No problems with ears, nose or throat.  No difficulty swallowing.  Resp: No coughing, wheezing or shortness of breath  GI: No nausea, vomiting,  heartburn, abdominal pain, diarrhea, constipation or change in bowel habits, liver cirrhosis  : No urinary frequency or dysuria, bladder or kidney problems  Musculoskeletal: No significant muscle or joint pains  Neurologic: No headaches, numbness, tingling, weakness, problems with balance or coordination  Psychiatric: No problems with anxiety, depression or mental health  Heme/immune/allergy: No history of bleeding or clotting problems or anemia.  No allergies or immune system problems  Endocrine: Diabetes  Skin: No rashes,worrisome lesions or skin problems  Vascular:  No claudication, lifestyle  limiting or otherwise; no ischemic rest pain; no non-healing ulcers. No weakness, No loss of sensation        Physical examination  Vitals: /66 (BP Location: Right arm, Patient Position: Chair, Cuff Size: Adult Regular)   Pulse 79   Wt 79.8 kg (175 lb 14.4 oz)   SpO2 98%   BMI 23.21 kg/m    BMI= Body mass index is 23.21 kg/m .    In general, the patient is a pleasant male in no apparent distress.    HEENT: Normiocephalic and atraumatic.  PERRLA.  EOMI.  Sclerae icteric, not injected.  Nares clear.  Pharynx without erythema or exudate.  Dentition intact.    Neck: No adenopathy.  No thyromegaly. Carotids +2/2 bilaterally without bruits.  No jugular venous distension.   Heart:  The PMI is in the 5th ICS in the midclavicular line. There is no heave. Regular rate and rhythm. Normal S1, S2 splits physiologically. No murmur, rub, click, or gallop.    Lungs: Clear to asculation.  No ronchi, wheezes, rales.  No dullness to percussion.   Abdomen: Soft, nontender, nondistended. No organomegaly. No AAA.  No bruits.   Extremities: No clubbing, cyanosis, or edema. The pulses were intact bilaterally.   Neurological: The neurological examination reveal a patient who was oriented to person, place, and time.  The remainder of the examination was nonfocal.    Cardiac tests include:    ECG - normal sinus rhythm, normal axis, intervals nonspecific T changes    Assessment and Plan    1. Preop - will check echo, CTA  2. Liver cirrhosis - plan transplant  3. Diabetes - on metformin  4. Depression - on zoloft    The patient is to return  prn. The patient understood the treatment plan as outlined above.  There were no barriers to learning. Patient accompanied by wife.      Hong Baer MD

## 2022-09-27 NOTE — COMMITTEE REVIEW
Abdominal Committee Review Note     Evaluation Date: 9/27/2022  Committee Review Date: 9/27/2022    Organ being evaluated for: Liver    Transplant Phase: Evaluation  Transplant Status: Active    Transplant Coordinator: Ra Winslow Jr.  Transplant Surgeon:   Jose Sterling    Referring Physician: Rosanne Talley    Primary Diagnosis: Cirrhosis: Fatty Liver (JARA)  Secondary Diagnosis:     Committee Review Members:  Licensed Mental Health Meng Mcadams, [facility]   Nutrition Helen Kyle, MAGALY   Pharmacist Ruben Turner, [facility]    - Clinical Deanne Jmienez, [facility]   Transplant Lashay Boyer, RN, Jr Ra Winslow, RN, Kamilah Oliver, APRN CNP, Ori Ghosh, YVONNE   Transplant Hepatology  Trudy Tino Browne MD, Arya Greene MD, Rita Stearns MD, Nae Rhoades MD, Dutch Rosario MD, Colin Tee MD, Dimitrios Elkins MD, Thomas M. Leventhal, MD   Transplant Surgery Hubert Miller MD, Carlos Fitzgerald MD, Kaur Shrestha, ROBB, Jose Sterling MD, Luiz Swartz MD       Transplant Eligibility: Cirrhosis with MELD, JARA    Committee Review Decision: Declined    Relative Contraindications: Malnutrition (relative), see below    Absolute Contraindications: Malnutrition, Other, frailty and overall poor physical condition    Committee Chair Colin Cortez MD verbally attested to the committee's decision.    Committee Discussion Details:     Extensive PT needed - just started at home    Plan: close evaluation based on frailty, follow up with Dr. Stearns in 3 months up in [place].    Can re-open IF patient improves frailty and strength

## 2022-09-27 NOTE — PROGRESS NOTES
Psychosocial Assessment for Liver Transplant Consult  Juan Epperson was seen in the Transplant Center as part of his evaluation as a potential liver transplant recipient.  Juan's wife Kyle was also present for today's appointments.  Living Situation: Juan and his wife Kyle live together in an apartment in [place], [place].  They moved to this apartment on August 15, 2022 and their rent is $885 per month.  Kyle assists Juan with his medication management, personal cares and transportation to medical appointments.  Juan uses a walker and a wheelchair for longer distances.  Kyle's sister Suyapa stays overnight to assist as needed.  Their niece Gemini provided transportation to today's appointments.  [place], [place] is approximately five hours away from the transplant center.  We discussed the requirement to stay locally for up to thirty days post discharge from the hospital at the time of transplantation, and the need for a caregiver to accompany patient.  We also discussed local lodging options, including the [employer].   The Caregiver Agreement for Liver Transplant was presented and discussed with patient and his wife.  They stayed at the [facility] last evening.  Kyle reports [employer] may reimburse them for a portion of the cost and she has her 's contact number.  I recommended Kyle explore shivam assistance through [employer].  I also offered transplant assistance funds at the time of transplantation.  Education/Employment:  Juan graduated high school and he worked as a  for over forty years.  He has not worked since June of 2019 due to a back surgery.  Juan is receiving Social Security Disability benefits.  Financial /Income: Juan and his wife both receive SSDI benefits.  Kyle also receives Workman's Compensation.  Juan and Kyle voice concern about affording local lodging at the time of transplantation.  Health Insurance:  [employer] Medicare Advantage.  This policy has a $680  deductible and $4400 annual maximum out of pocket.  These out of pocket costs are a financial hardship for patient and wife.  This writer talked with Juan and Kyle about the financial risks of transplant, particularly about the high cost of transplant related medications and the importance of maintaining adequate health insurance coverage.  Family/Social Support: Juan and Kyle have been  for twenty-three years, and together for forty-two years.  Kyle is an involved an supportive care giver.  Kyle has one daughter, Cameron.  Juan does not have any biological children.    Juan's parents are .  He has one brother, Lg, who lives in [place], [place].  Lg has dementia at the age of sixty-four.  Juan has two sisters who live in [place], [place].  This writer stressed the importance of having a stable and involved support network before and after transplant.  Provided Juan and his wife with education about the relationship between a stable support system and better surgical and post-transplant outcomes compared to patients with a limited support system.    Functional Status: Frailty is a concern and this should be monitored.  Chemical Dependency:  Juan denies any history of pain medication abuse or illicit drug use other than marijuana in his teens and twenties.  Juan denies any use of tobacco products.  Juan denies any alcohol use in over three years, and no history of alcohol abuse or dependency.  Mental Health: Juan denies any mental health history.  He specifically denies any history of suicidal ideation or hospitalization for mental health treatment.  Adjustment to Illness:  Juan has a non-alcoholic liver disease.  He has side   Impression/Recommendations:   Juan and his wife Kyle verbalize understanding the psychosocial risks of transplant and teaching provided during this evaluation.  Juan and his wife Kyle have financial concerns related to travel and lodging for transplantation.  I encouraged  Kyle to explore shivam assistance through [employer] [place] [employer].  Juan has adequate health insurance for transplant, though his financial responsibility is a financial hardship.  I did encourage Kyle to contact the [facility] for Medicare Health Insurance Counseling.   There are no chemical or mental health concerns.  The Caregiver Agreement for Liver Transplantation was discussed.  Juan's identified care givers are his wife Kyle, sister-in-law Suyapa and niece Gemini.  Kyle is disabled but she does not believe her disability would preclude her from being able to provide care giving for Juan.     This writer will remain available to assist patient throughout the evaluation process and will follow patient through transplant if he is listed.  It was a pleasure to evaluate this patient for liver transplant.   Teaching completed during assessment:  1.     Housing and relocation needs post transplant.  2.     Caregiver needs post transplant.  3.     Financial issues related to transplant.  4.     Risks of alcohol use post transplant.  5.     Common psychosocial stressors pre/post transplant.          6.     Liver Transplant support group availability.          7.     [facility] [employer] Direct[facility] [employer] Cho[employer] directive given to patient.             Psychosocial Risks of Transplant Reviewed:  1.     Increased stress related to your emotional, family, social, employment, or   financial situation.  2.     Affect on work and/or disability benefits.  3.     Affect on future health and life insurance.  4.     Transplant outcome expectations may not be met.  5.     Mental Health risks: anxiety, depression, PTSD, guilt, grief and chronic fatigue.     YURI Ramirez

## 2022-09-27 NOTE — NURSING NOTE
"Chief Complaint   Patient presents with     Transplant Evaluation     LIVER     Blood pressure 97/63, pulse 82, height 1.803 m (5' 11\"), weight 79.6 kg (175 lb 6.4 oz), SpO2 97 %.    Mali Pelaez CMA    "

## 2022-09-27 NOTE — LETTER
"    9/27/2022         RE: Juan Epperson  [address] [facility] [address]  [facility] [address]        Dear Colleague,    Thank you for referring your patient, Juan Epperson, to the [facility]. Please see a copy of my visit note below.      \"Much or all of the text in this note was generated through the use of Dragon Dictate voice to text software. Errors in spelling or words which appear to be out of context are unintentional, may be present due having escaped editing\"    Assessment and Plan:  1. liver transplant evaluation - patient is a fair candidate overall. Benefits and surgical risks of a liver transplantation were discussed.  2.  End stage liver disease due to Laennec's    Surgical evaluation:  1. Portal Vein:Patent  2. Hepatic Artery: Open  3. TIPS: absent  4. Previous Abdominal Surgery: Yes  Hernia surgery  5. Hepatocellular Carcinoma: None  6. Ascites: Present - minimal  7. Costal Angle: wide  8. Portopulmonary Hypertension: absent  9. Hepatopulmonary Syndrome: absent  10. Cardiac Evaluation: needs stress echocardiogram  11. Nutritional Status: Moderate  12. Diabetes: yes, on pills  13.Hypertension no  14. Smoker:no  14: Fraility index:yes  15. Meets guidelines to receive Living Donor  Yes -  ...  16. Potential Living donors No    MELD-Na score: 15 at 9/27/2022  9:57 AM  MELD score: 13 at 9/27/2022  9:57 AM  Calculated from:  Serum Creatinine: 0.51 mg/dL (Using min of 1 mg/dL) at 9/27/2022  9:57 AM  Serum Sodium: 135 mmol/L at 9/27/2022  9:57 AM  Total Bilirubin: 2.4 mg/dL at 9/27/2022  9:57 AM  INR(ratio): 1.25 at 9/27/2022  9:57 AM  Age: 61 years    Recommendations:   He needs to complete evaluation.  Complete full work-up.  He needs outpatient physical therapy.  Also needs MELD labs      Patients overall evaluation will be discussed at the Liver Transplant selection committee meeting with a final recommendation on the patients suitability for transplant to be made at that " time.    Consult Full  Details:  Juan Epperson was seen in consultation at the request of Dr. Stearns for evaluation as a potential liver transplant recipient.    Reason for Visit:  Juan Epperson is a 61 year old year old male with Laennec's, who presents for liver transplant evaluation.    HPI:  Presenting complaint: easy fatiguablity and loss of muscle mass    Patient went for back surgery and at that time he was noted to have liver disease.  He has primarily decompensated in the form of ascites.  No variceal bleeding.  Very severe muscle weakness.      Past Medical History:   Diagnosis Date     Cancer (H)      Diabetes (H)      Diabetes mellitus, type 2 (H) 07/20/2022     Hepatic encephalopathy (H) 07/20/2022     History of blood transfusion      Liver cirrhosis secondary to JARA (H) 07/20/2022     Pleural effusion 07/20/2022     Pneumonia      No past surgical history on file.  No past surgical history on file.  Family History   Problem Relation Age of Onset     Chronic Obstructive Pulmonary Disease Mother      Dementia Brother      Chronic Obstructive Pulmonary Disease Sister      Allergies   Allergen Reactions     Codeine Rash and Hives     Prior to Admission medications    Medication Sig Start Date End Date Taking? Authorizing Provider   folic acid (FOLVITE) 1 MG tablet Take 1 tablet by mouth daily 8/11/22   Reported, Patient   furosemide (LASIX) 20 MG tablet Take 20 mg by mouth 8/12/22 8/12/23  Reported, Patient   furosemide (LASIX) 40 MG tablet Take 1 tablet by mouth daily 8/12/22 8/12/23  Reported, Patient   lactulose (CHRONULAC) 10 GM/15ML solution Take 45 mLs by mouth 7/27/22   Reported, Patient   Magnesium Oxide 500 MG TABS     Reported, Patient   metFORMIN (GLUCOPHAGE) 1000 MG tablet Take 1,000 mg by mouth    Reported, Patient   midodrine (PROAMATINE) 5 MG tablet Take 5 mg by mouth 7/27/22   Reported, Patient   pantoprazole (PROTONIX) 40 MG EC tablet Take 1 tablet by mouth daily 8/19/22   Reported,  Patient   potassium chloride ER (KLOR-CON M) 20 MEQ CR tablet Take 1 tablet by mouth daily 8/12/22 8/12/23  Reported, Patient   rifaximin (XIFAXAN) 550 MG TABS tablet Take 550 mg by mouth    Reported, Patient   sertraline (ZOLOFT) 50 MG tablet Take 50 mg by mouth 5/23/22   Reported, Patient   spironolactone (ALDACTONE) 100 MG tablet Take 1 tablet by mouth 2 times daily 8/22/22   Reported, Patient       Previous Transplant Hx: No    Cardiovascular Hx:       h/o Cardiac Issues: No       Exercise Tolerance: shortness of breath with exertion.    Potential Donor(s): No    ROS:    REVIEW OF SYSTEMS (check box if normal)  [x]                GENERAL  [x]                  PULMONARY [x]                 GENITOURINARY  [x]                 CNS                 [x]                  CARDIAC  [x]                  ENDOCRINE  [x]                 EARS,NOSE,THROAT [x]                  GASTROINTESTINAL [x]                  NEUROLOGIC    [x]                 MUSCLOSKELTAL  [x]                   HEMATOLOGY    Examination:     Vitals:  There were no vitals taken for this visit.    GENERAL APPEARANCE: alert and no distress  EYES: PERRL  HENT: mouth without ulcers or lesions  NECK: supple, no adenopathy  RESP: lungs clear to auscultation - no rales, rhonchi or wheezes  CV: regular rhythm, normal rate, no rub   ABDOMEN:  soft, ascites present.  MS: extremities normal- no gross deformities noted, no evidence of inflammation in joints, no muscle tenderness  SKIN: no rash  NEURO: Normal strength and tone, sensory exam grossly normal, mentation intact and speech normal  PSYCH: mentation appears normal. and affect normal/bright      Results:   Recent Results (from the past 168 hour(s))   EKG 12-lead, tracing only (Same Day)    Collection Time: 09/27/22  7:50 AM   Result Value Ref Range    Systolic Blood Pressure  mmHg    Diastolic Blood Pressure  mmHg    Ventricular Rate 78 BPM    Atrial Rate 78 BPM    TN Interval 134 ms    QRS Duration 84 ms    QT  402 ms    QTc 458 ms    P Axis 65 degrees    R AXIS -14 degrees    T Axis 34 degrees    Interpretation ECG       Sinus rhythm  Nonspecific T wave abnormality  Abnormal ECG  No previous ECGs available     CBC with platelets    Collection Time: 09/27/22  9:57 AM   Result Value Ref Range    WBC Count 6.5 4.0 - 11.0 10e3/uL    RBC Count 3.60 (L) 4.40 - 5.90 10e6/uL    Hemoglobin 11.8 (L) 13.3 - 17.7 g/dL    Hematocrit 34.9 (L) 40.0 - 53.0 %    MCV 97 78 - 100 fL    MCH 32.8 26.5 - 33.0 pg    MCHC 33.8 31.5 - 36.5 g/dL    RDW 14.9 10.0 - 15.0 %    Platelet Count 149 (L) 150 - 450 10e3/uL     I had a long discussion with the patient regarding liver transplantation which included but was not limited to  the following points:    1. Liver transplant selection committee process.  2. The federal rules for cadaveric waiting list, the size and blood type matching of the organ. The availability of living-related donor transplantation.  3. The types of donors: brain death donors, non-heart beating donors, partial liver grafts: splits and living donor grafts  4. Extended criteria  Donors (older age, steasosis) and the increased  risk of primary non-function using the extended criteria donors  5. The CDC high risk donors,  Risk of donor transmitted infections and donor transmitted malignancy  6. The liver transplant operation and the associated risks and technical complications which can include intraoperative death, post operative death,  Primary non-function, bleeding requiring re-operations, arterial and biliary complications, bowel perforations, and intra abdominal abscess. Some of these complicaitons may require a second operation.  7. The postoperative course, the ICU stay and risk of postoperative complications which can include sepsis, MI, stroke, brain injury, pneumonia, pleural effusions, and renal dysfunction.  8. The current 1 year and 5 year graft and patient survivals.  9. The need for life long immunosuppressive  therapy and the side effects of these medications, including the possibility of toxicity, opportunistic infections, risk of cancer including lymphoma, and the possibility of rejection even if the patient is taking the medication exactly as prescribed.  10. The need for compliance with medications and follow-up visits in the clinic and thereafter.  11. The patient and family understand these risks and wish to proceed to transplantation       Again, thank you for allowing me to participate in the care of your patient.      Sincerely,      Jose Sterling MD

## 2022-09-27 NOTE — NURSING NOTE
Chief Complaint   Patient presents with     New Patient     NEW pre liver transplant evaluation      Vitals were taken, medications reconciled, and EKG was performed.    Maurice Oliveira EMT  7:49 AM

## 2022-09-28 LAB
ATRIAL RATE - MUSE: 78 BPM
DIASTOLIC BLOOD PRESSURE - MUSE: NORMAL MMHG
INTERPRETATION ECG - MUSE: NORMAL
P AXIS - MUSE: 65 DEGREES
PR INTERVAL - MUSE: 134 MS
QRS DURATION - MUSE: 84 MS
QT - MUSE: 402 MS
QTC - MUSE: 458 MS
R AXIS - MUSE: -14 DEGREES
SYSTOLIC BLOOD PRESSURE - MUSE: NORMAL MMHG
T AXIS - MUSE: 34 DEGREES
VENTRICULAR RATE- MUSE: 78 BPM

## 2022-09-29 LAB
ETHYL GLUCURONIDE UR QL SCN: NEGATIVE NG/ML
GAMMA INTERFERON BACKGROUND BLD IA-ACNC: 0.09 IU/ML
M TB IFN-G BLD-IMP: NEGATIVE
M TB IFN-G CD4+ BCKGRND COR BLD-ACNC: 7.74 IU/ML
MITOGEN IGNF BCKGRD COR BLD-ACNC: 0.04 IU/ML
MITOGEN IGNF BCKGRD COR BLD-ACNC: 0.04 IU/ML
QUANTIFERON MITOGEN: 7.83 IU/ML
QUANTIFERON NIL TUBE: 0.09 IU/ML
QUANTIFERON TB1 TUBE: 0.13 IU/ML
QUANTIFERON TB2 TUBE: 0.13

## 2022-09-30 LAB
PLPETH BLD-MCNC: <10 NG/ML
POPETH BLD-MCNC: <10 NG/ML

## 2022-10-24 ENCOUNTER — TELEPHONE (OUTPATIENT)
Dept: CARDIOLOGY | Facility: CLINIC | Age: 62
End: 2022-10-24

## 2022-10-24 PROBLEM — K70.30 ALCOHOLIC CIRRHOSIS (H): Status: RESOLVED | Noted: 2022-09-27 | Resolved: 2022-10-24

## 2022-10-24 NOTE — TELEPHONE ENCOUNTER
[facility] Call Center    Phone Message    May a detailed message be left on voicemail: yes     Reason for Call: Other: Fiona from [facility] requesting prior auth urgently. Patient has angiogram scheduled at [facility] on 10/27. Needs to be submitted via [employer] portal     Action Taken: Message routed to:  [facility] ([facility] cardiology    Travel Screening: Not Applicable

## 2022-10-25 NOTE — TELEPHONE ENCOUNTER
"Left VM for Fiona confirming I spoke with Pramod Mann from financial services who states:      \"Unfortunately, our team only secures for  services taking place at one of our [facility]. I'm not sure we have any resources for  externals\"    Stated I believe [employer] PA or financial department will have to start Prior Auth since the procedure is being done at their location.     Paulino Chan, RN   Cardiology Nurse Coordinator         "

## 2022-10-27 ENCOUNTER — TELEPHONE (OUTPATIENT)
Dept: CARDIOLOGY | Facility: CLINIC | Age: 62
End: 2022-10-27

## 2022-10-27 NOTE — TELEPHONE ENCOUNTER
[facility] Call Center    Phone Message    May a detailed message be left on voicemail: yes     Reason for Call: Other:     Anastasia is requesting a call back for verification of transplant status.    Action Taken: Other: cardio    Travel Screening: Not Applicable     Thank you!  [facility]

## 2022-11-02 NOTE — TELEPHONE ENCOUNTER
Called [employer] back about patient's transplant status     LVM with direct number to call back with questions